# Patient Record
Sex: MALE
[De-identification: names, ages, dates, MRNs, and addresses within clinical notes are randomized per-mention and may not be internally consistent; named-entity substitution may affect disease eponyms.]

---

## 2020-10-13 PROBLEM — Z00.00 ENCOUNTER FOR PREVENTIVE HEALTH EXAMINATION: Status: ACTIVE | Noted: 2020-10-13

## 2020-10-14 ENCOUNTER — NON-APPOINTMENT (OUTPATIENT)
Age: 35
End: 2020-10-14

## 2020-10-14 ENCOUNTER — APPOINTMENT (OUTPATIENT)
Dept: CARDIOLOGY | Facility: CLINIC | Age: 35
End: 2020-10-14
Payer: COMMERCIAL

## 2020-10-14 VITALS
HEIGHT: 71 IN | OXYGEN SATURATION: 99 % | BODY MASS INDEX: 25.2 KG/M2 | WEIGHT: 180 LBS | HEART RATE: 77 BPM | TEMPERATURE: 98.1 F | SYSTOLIC BLOOD PRESSURE: 132 MMHG | RESPIRATION RATE: 16 BRPM | DIASTOLIC BLOOD PRESSURE: 83 MMHG

## 2020-10-14 DIAGNOSIS — R94.31 ABNORMAL ELECTROCARDIOGRAM [ECG] [EKG]: ICD-10-CM

## 2020-10-14 DIAGNOSIS — Z72.89 OTHER PROBLEMS RELATED TO LIFESTYLE: ICD-10-CM

## 2020-10-14 DIAGNOSIS — Z87.891 PERSONAL HISTORY OF NICOTINE DEPENDENCE: ICD-10-CM

## 2020-10-14 DIAGNOSIS — F41.9 ANXIETY DISORDER, UNSPECIFIED: ICD-10-CM

## 2020-10-14 DIAGNOSIS — R07.89 OTHER CHEST PAIN: ICD-10-CM

## 2020-10-14 PROCEDURE — 99203 OFFICE O/P NEW LOW 30 MIN: CPT

## 2020-10-14 PROCEDURE — 93000 ELECTROCARDIOGRAM COMPLETE: CPT

## 2020-10-14 NOTE — ASSESSMENT
[FreeTextEntry1] : EKG: Sinus rhythm with no significant ST or T wave changes.\par \par 35 your male with no significant past medical history presents to me after having chest pain about a month ago and having an EKG demonstrating possible left atrial enlargement. Review of EKG here today does not show significant evidence for left atrial enlargement or any other significant abnormalities. He is not having any further chest pain. It is certainly likely that his chest pains were secondary to stress and possibly a GI cause related to distress. He certainly represents a very low risk of cardiovascular disease. I will plan an echocardiogram to evaluate the questionable left atrial enlargement but this is normal there will be no need for additional cardiac testing.

## 2020-10-14 NOTE — HISTORY OF PRESENT ILLNESS
[FreeTextEntry1] : Patient presents today after having had chest discomfort about a month ago and followed up in your office her EKG demonstrating left atrial enlargement. Patient reports the symptoms as a pressure in his left upper chest that would last for hours at a time over the course of a few days. He reports it was a very stressful time and eventually the symptoms resolved completely. He has not had them for at least several weeks. The symptoms are not exertional in nature and had no real aggravating or relieving factors. There were no other associated symptoms. Since the chest pain resolved he has been feeling well and offers no complaints at this time. Patient denies shortness of breath, palpitations, orthopnea, presyncope, syncope.

## 2020-10-14 NOTE — DISCUSSION/SUMMARY
[FreeTextEntry1] : 1. Check echocardiogram to evaluate his questionable left atrial enlargement on EKG.\par 2. No additional cardiac testing at this time.\par 3. No additional cardiac medications at this time.\par 4. Will obtain most recent bloodwork.\par 5. I will discuss the results of his echo over the phone. If it is normal though with no need for additional cardiac followup at this time, but he will follow up if you follow up his primary care needs. If he has more issues with chest pains or other symptoms he will let me know.

## 2020-10-29 ENCOUNTER — APPOINTMENT (OUTPATIENT)
Dept: CARDIOLOGY | Facility: CLINIC | Age: 35
End: 2020-10-29
Payer: COMMERCIAL

## 2020-10-29 PROCEDURE — 99072 ADDL SUPL MATRL&STAF TM PHE: CPT

## 2020-10-29 PROCEDURE — 93306 TTE W/DOPPLER COMPLETE: CPT

## 2020-11-03 ENCOUNTER — NON-APPOINTMENT (OUTPATIENT)
Age: 35
End: 2020-11-03

## 2025-01-14 NOTE — REVIEW OF SYSTEMS
Called and notified Nigel Torres's wife  of echo results and recommendations per Dr. Richards.  He will keep follow up appointment on 2/25/25.  Discussed plan in detail with patient.  Patient verbalizes understanding and all questions answered.         ----- Message from Reg Richards MD sent at 1/10/2025  8:06 PM CST -----  Stable echo for his age      Result Text   Final Impressions    * Mild LVH. Normal LV function.    * Trace-mild mitral valve regurgitation.    * Moderate left atrial enlargement.    * Mildly dilated proximal ascending aorta ( 4.1 cm).     
[FreeTextEntry1] : Patient denies headache, other than noted above full review of systems is otherwise negative.